# Patient Record
Sex: FEMALE | Race: WHITE | NOT HISPANIC OR LATINO | Employment: FULL TIME | URBAN - METROPOLITAN AREA
[De-identification: names, ages, dates, MRNs, and addresses within clinical notes are randomized per-mention and may not be internally consistent; named-entity substitution may affect disease eponyms.]

---

## 2018-05-01 ENCOUNTER — TRANSCRIBE ORDERS (OUTPATIENT)
Dept: PERINATAL CARE | Facility: CLINIC | Age: 33
End: 2018-05-01

## 2018-05-01 DIAGNOSIS — O99.810 ABNORMAL GLUCOSE TOLERANCE IN PREGNANCY: Primary | ICD-10-CM

## 2018-05-03 ENCOUNTER — OFFICE VISIT (OUTPATIENT)
Dept: PERINATAL CARE | Facility: CLINIC | Age: 33
End: 2018-05-03
Payer: COMMERCIAL

## 2018-05-03 ENCOUNTER — OFFICE VISIT (OUTPATIENT)
Dept: PERINATAL CARE | Facility: CLINIC | Age: 33
End: 2018-05-03

## 2018-05-03 VITALS
WEIGHT: 118.8 LBS | DIASTOLIC BLOOD PRESSURE: 64 MMHG | BODY MASS INDEX: 20.28 KG/M2 | HEART RATE: 86 BPM | SYSTOLIC BLOOD PRESSURE: 110 MMHG | HEIGHT: 64 IN

## 2018-05-03 VITALS
HEART RATE: 86 BPM | HEIGHT: 64 IN | SYSTOLIC BLOOD PRESSURE: 110 MMHG | DIASTOLIC BLOOD PRESSURE: 64 MMHG | BODY MASS INDEX: 20.14 KG/M2 | WEIGHT: 118 LBS

## 2018-05-03 DIAGNOSIS — O24.419 GESTATIONAL DIABETES MELLITUS (GDM) IN THIRD TRIMESTER, GESTATIONAL DIABETES METHOD OF CONTROL UNSPECIFIED: ICD-10-CM

## 2018-05-03 DIAGNOSIS — O99.810 ABNORMAL GLUCOSE AFFECTING PREGNANCY: Primary | ICD-10-CM

## 2018-05-03 DIAGNOSIS — O24.410 DIET CONTROLLED GESTATIONAL DIABETES MELLITUS (GDM) IN THIRD TRIMESTER: Primary | ICD-10-CM

## 2018-05-03 DIAGNOSIS — O99.810 ABNORMAL GLUCOSE TOLERANCE IN PREGNANCY: ICD-10-CM

## 2018-05-03 DIAGNOSIS — M25.50 ARTHRALGIA, UNSPECIFIED JOINT: ICD-10-CM

## 2018-05-03 DIAGNOSIS — Z3A.29 29 WEEKS GESTATION OF PREGNANCY: ICD-10-CM

## 2018-05-03 PROCEDURE — G0108 DIAB MANAGE TRN  PER INDIV: HCPCS | Performed by: DIETITIAN, REGISTERED

## 2018-05-03 PROCEDURE — 99215 OFFICE O/P EST HI 40 MIN: CPT | Performed by: NURSE PRACTITIONER

## 2018-05-03 RX ORDER — LANCETS 33 GAUGE
EACH MISCELLANEOUS
Qty: 200 EACH | Refills: 3 | Status: SHIPPED | OUTPATIENT
Start: 2018-05-03 | End: 2022-03-11

## 2018-05-03 RX ORDER — BLOOD SUGAR DIAGNOSTIC
STRIP MISCELLANEOUS
Qty: 150 EACH | Refills: 3 | Status: SHIPPED | OUTPATIENT
Start: 2018-05-03 | End: 2022-03-11

## 2018-05-03 NOTE — PATIENT INSTRUCTIONS
1  Start 1900 calorie GDM diet with 3 meals and 3 snacks including protein  2  Check fasting and 2 hours after start of meal    3  Keep glucose log and report tomorrow  4  Have labs done  5  Always have glucose available for hypoglycemia, use 15 by 15 rule  6  Goals fasting 60-90, 1 hour after start of meal 135 or less and 2 hours after start of meal 120 or less  7  Keep fetal growth US scheduled  8  Continue follow-up with your OB and MFM

## 2018-05-03 NOTE — PROGRESS NOTES
DATE: 18   RE: Cosmo Syed   : 1985  NELSON: Estimated Date of Delivery: 2018  EGA: 29  weeks    Dear Dr Francisco Hagen: Thank you for referring your patient to the [facility]regnancy Program at [address]  The patient was seen today for medical nutrition therapy for the treatment of diabetes during pregnancy  In addition to diabetes, the nutrition status is complicated by being underweight prior to pregnancy  The following was reviewed with the patient:      Weight gain during in pregnancy  Based on the patients height of 5' 4" (1 626 m) inches, pre-pregnancy weight of106 pounds (BMI -18 1), we would recommend a total weight gain of 28-40 pounds for the pregnancy  o The patients current weight is 53 9 kg (118 lb 12 8 oz) pounds, and her weight gain to date is 12 pounds   Basic review of macronutrients   Meal pattern should consist of three small meals and three snacks daily   Carbohydrate gram amounts per meal    Instructions on how to read a food label   Appropriate serving size of foods   Incorporating protein at each meal and snack in the importance of protein in relationship to blood glucose control   Individualized meal plan: 1900 gestational diabetes diet   Use of food diary to maintain a meal plan   Post-partum diet recommendations   Report blood glucose levels to [address] weekly or as directed  o Phone: [phone]  If no response in 24 hours, call [phone]   o Fax: [phone]  o Email: [email or web address]   Follow up: As Needed    Thank you for the opportunity to participate in the care of this patient  I can be reached at [phone] should you have any questions  Time spent with patient 3:30-4:40 PM; time spent face to face counseling greater than 50% of the appointment      Sincerely,     [facility]  Diabetes Educator  Diabetes and Pregnancy Program
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
## 2018-05-08 LAB
25(OH)D3 SERPL-MCNC: 42 NG/ML (ref 30–100)
ALBUMIN SERPL-MCNC: 3.3 G/DL (ref 3.6–5.1)
ALBUMIN/GLOB SERPL: 1.3 (CALC) (ref 1–2.5)
ALP SERPL-CCNC: 86 U/L (ref 33–115)
ALT SERPL-CCNC: 12 U/L (ref 6–29)
AST SERPL-CCNC: 15 U/L (ref 10–30)
BILIRUB SERPL-MCNC: 0.4 MG/DL (ref 0.2–1.2)
BUN SERPL-MCNC: 11 MG/DL (ref 7–25)
BUN/CREAT SERPL: ABNORMAL (CALC) (ref 6–22)
CALCIUM SERPL-MCNC: 8.7 MG/DL (ref 8.6–10.2)
CHLORIDE SERPL-SCNC: 105 MMOL/L (ref 98–110)
CO2 SERPL-SCNC: 27 MMOL/L (ref 20–31)
CREAT SERPL-MCNC: 0.52 MG/DL (ref 0.5–1.1)
EST. AVERAGE GLUCOSE BLD GHB EST-MCNC: 97 (CALC)
EST. AVERAGE GLUCOSE BLD GHB EST-SCNC: 5.4 (CALC)
GLOBULIN SER CALC-MCNC: 2.5 G/DL (CALC) (ref 1.9–3.7)
GLUCOSE SERPL-MCNC: 73 MG/DL (ref 65–99)
HBA1C MFR BLD: 5 % OF TOTAL HGB
POTASSIUM SERPL-SCNC: 4 MMOL/L (ref 3.5–5.3)
PROT SERPL-MCNC: 5.8 G/DL (ref 6.1–8.1)
SL AMB EGFR AFRICAN AMERICAN: 147 ML/MIN/1.73M2
SL AMB EGFR NON AFRICAN AMERICAN: 126 ML/MIN/1.73M2
SODIUM SERPL-SCNC: 137 MMOL/L (ref 135–146)
T4 FREE SERPL-MCNC: 0.9 NG/DL (ref 0.8–1.8)
TSH SERPL-ACNC: 1.47 MIU/L

## 2018-05-09 ENCOUNTER — TELEPHONE (OUTPATIENT)
Dept: PERINATAL CARE | Facility: CLINIC | Age: 33
End: 2018-05-09

## 2018-05-09 NOTE — TELEPHONE ENCOUNTER
Date:  18  RE: Adelaide Nielsen    : 1985  NELSON: Estimated Date of Delivery: 18  EGA: 30w0d    Diet controlled GDM  Date Fasting Post-  breakfast Post-  lunch Post-  dinner Comments    76 120 118 114 82 post snack    88 101 89 142 Ate out    80 101 97 137 Too much carb   79 93 95 107     90 91 117 107                                Current regimen:  1900 calorie GDM diet  Self-monitoring blood glucose fasting and 2 hours after start of meals  Exercise: walking  Plan:  Continue diet, avoid foods that may increase post meal BG, watch carb, protein and fat  Continue SMBG and walking regimen  18 A1c 5 0%, patient made aware all labs within normal      Date due to report next:  Tuesday, 18 or sooner if blood sugars are out of range        BARBARA Robles  Diabetes Educator   Diabetes and Pregnancy Program

## 2018-05-22 ENCOUNTER — DOCUMENTATION (OUTPATIENT)
Dept: PERINATAL CARE | Facility: CLINIC | Age: 33
End: 2018-05-22

## 2018-05-22 NOTE — PROGRESS NOTES
Date:  18  RE: Giselle Scott    : 1985  NELSON: Estimated Date of Delivery: 18  EGA: 30w6d    Diet controlled GDM  Date Fasting Post-  breakfast Post-  lunch Post-  dinner Comments    69 96 119 111    5/10 78 95 86 95     92 87 100 121     74 90 120 98     64 87 82 95     75 109 97 105    5/15 73 97 97 120                Current regimen:  1900 calorie GDM diet  Self-monitoring blood glucose fasting and 2 hours after start of meals  Exercise: walking  Plan:  Continue diet, avoid foods that may increase post meal BG, watch carb, protein and fat  Continue SMBG and walking regimen  18 A1c 5 0%  Date due to report next:  Tuesday, 18 or sooner if needed        BARBARA Griffin  Diabetes Educator   Diabetes and Pregnancy Program

## 2018-05-25 ENCOUNTER — TELEPHONE (OUTPATIENT)
Dept: PERINATAL CARE | Facility: CLINIC | Age: 33
End: 2018-05-25

## 2018-05-25 NOTE — TELEPHONE ENCOUNTER
Date:  18  RE: Uday Smith    : 1985  NELSON: Estimated Date of Delivery: 18  EGA: 32w2d    Diet controlled GDM  Date Fasting Post-  breakfast Post-  lunch Post-  dinner Comments    77 90 118 103     81 91 111 111     81 94 114 123     72 92 99 80     70 83 141 114     67 103 109 86     69 89 77 110     78 86 107 89     74 98 110 129        Current regimen:  1900 calorie GDM diet  Self-monitoring blood glucose fasting and 2 hours after start of meals  Exercise: walking  Plan:  Continue diet, avoid foods that may increase post meal BG, watch carb, protein and fat  Continue SMBG and walking regimen  18 A1c 5 0%  Date due to report next:  Tuesday, 18 or sooner if needed        Chu Khan  Diabetes Educator   Diabetes and Pregnancy Program

## 2018-05-30 ENCOUNTER — DOCUMENTATION (OUTPATIENT)
Dept: PERINATAL CARE | Facility: CLINIC | Age: 33
End: 2018-05-30

## 2018-05-30 NOTE — PROGRESS NOTES
Date:  18  RE: Uday Luis    : 1985  NELSON: Estimated Date of Delivery: 18  EGA: 33w0d  Dr Ceron Section controlled GDM  Date Fasting Post-  breakfast Post-  lunch Post-  dinner Comments   -16 77 90 118 103    -17 81 91 111 111    -18 81 94 114 123    - 72 92 99 80    -20 70 83 141 114    - 67 103 109 86    - 69 89 77 110    - 78 86 107 89    -24 74 98 110 129        Current regimen:  1900 calorie GDM diet  Self-monitoring blood glucose fasting and 2 hours after start of meals  Exercise: walking  Plan:  Continue diet with 3 meals and 3 snacks including protein  Continue SMBG and walking regimen  18 A1c 5 0%  Will request last fetal growth US report  Date due to report next:  Tuesday, 18 or sooner if needed      BARBARA Larson  Diabetes Educator   Diabetes and Pregnancy Program

## 2018-06-06 ENCOUNTER — TELEPHONE (OUTPATIENT)
Dept: PERINATAL CARE | Facility: CLINIC | Age: 33
End: 2018-06-06

## 2018-06-06 NOTE — TELEPHONE ENCOUNTER
Date:  18  RE: Minor Harm    : 1985  NELSON: Estimated Date of Delivery: 18  EGA: 33w0d  Dr Zarina Miranda controlled GDM  Blood Glucose Meter: One-Touch Verio    Date Fasting Post-  breakfast Post-  lunch Post-  dinner Comments    69 98 95 116     73 108 119 104     68 89 82 107     79 87 86 116     80 92 104 98     87 102 100 104     73 112 93 118     86 116 102 119     79 107 118 89    6/3 83 110 98 128 chips    78 114 103 106     79 102 110 139 Pizza w/ extra meat & cheese    73 105          Current regimen:  1900 calorie GDM diet  Self-monitoring blood glucose fasting and 2 hours after start of meals  Exercise: walking  Plan:  Continue diet with 3 meals and 3 snacks including protein  Continue SMBG and walking regimen  18 A1c 5 0%  Will request last fetal growth US report  Date due to report next:   or sooner if needed      Che Claros  Diabetes Educator   Diabetes and Pregnancy Program

## 2018-06-13 ENCOUNTER — TELEPHONE (OUTPATIENT)
Dept: PERINATAL CARE | Facility: CLINIC | Age: 33
End: 2018-06-13

## 2018-06-20 ENCOUNTER — TELEPHONE (OUTPATIENT)
Dept: PERINATAL CARE | Facility: CLINIC | Age: 33
End: 2018-06-20

## 2018-06-20 NOTE — TELEPHONE ENCOUNTER
Note      Date:  18  RE: Annette Campos    : 1985  NELSON: Estimated Date of Delivery: 18  EGA: 36w0d  Dr Nogueira Morning     Diet controlled GDM  Blood Glucose Meter: One-Touch Verio     Date Fasting Post-  breakfast Post-  lunch Post-  dinner Comments    67 104 90 145      74 101 105 109     6/15 74 93 103 112      74 103 95 117      65 110 118 106      78 118 107 105  snacks    77 98 105 11                      Current regimen:  1900 calorie GDM diet  Food diary log indicated patient is eating only 75 gm CHO at all  3 meals total  Food diary did not include snacks  Wt gain in 35 wks is 12 pounds total    Self-monitoring blood glucose fasting and 2 hours after start of meals  Exercise: walking       Plan:  Continue diet with 3 meals and 3 snacks including protein  Advised patient to include her 3 snacks in her food diary to determine nutrition adequacy for appropriate fetal growth  Continue SMBG and walking regimen  18 A1c 5 0%  Last fetal growth US appeared normal  Continue fetal growth every 4 weeks       Date due to report next:  Tuesday, 18 or sooner if needed      Transcribed by:  Olga Morley blood sugars login via email    Ruperto Smith, [address]  Diabetes Educator   Diabetes and Pregnancy Program

## 2018-06-29 ENCOUNTER — TELEPHONE (OUTPATIENT)
Dept: PERINATAL CARE | Facility: CLINIC | Age: 33
End: 2018-06-29

## 2018-06-29 NOTE — TELEPHONE ENCOUNTER
Note      Date:  18  RE: Sri Ma    : 1985  NELSON: Estimated Date of Delivery: 18  EGA: 37w2d  Dr Evert Lopez     Diet controlled GDM  Blood Glucose Meter: One-Touch Verio     Date Fasting Post-  breakfast Post-  lunch Post-  dinner Comments   6-20 78 107 88 113    6-21 71 104 119 104    - 79 94 113 103    - 82 102 104 106    -24 67 110 99 100     79 92 111 116    - 77 116 97 119    - 63 80 102 115    - 72 109 78 96     83                Current regimen:  1900 calorie GDM diet; 3 meals and 3 snacks  Self-monitoring blood glucose fasting and 2 hours after start of meals  Exercise: walking       Plan:  Continue diet with 3 meals and 3 snacks including protein  Advised patient to include her 3 snacks in her food diary to determine nutrition adequacy for appropriate fetal growth  Continue SMBG and walking regimen  18 A1c 5 0%  Last fetal growth US appeared normal  Continue fetal growth every 4 weeks       Date due to report next:  Tuesday, 7/3/18 or sooner if needed        Calderon Suazo  Diabetes Educator   Diabetes and Pregnancy Program

## 2018-07-06 ENCOUNTER — TELEPHONE (OUTPATIENT)
Dept: PERINATAL CARE | Facility: CLINIC | Age: 33
End: 2018-07-06

## 2018-07-06 NOTE — TELEPHONE ENCOUNTER
Date:  18  RE: Amanda Orozco    : 1985  Estimated Date of Delivery: 18  EGA: 38w2d  OB/GYN: Dufm Monday      Date Fasting Post-  breakfast Post-  lunch Post-  dinner Before bedtime Carbs Comments   6-29  100 95 96      6-30 80 111 101 94      7-1 75 93 78 117      7-2 73 97 107 113      7-3 83 102 89 79      7-4 72 104 63 125 116     7-5 76 94 102 104          Current regimen:  1900 calorie GDM diet; 3 meals and 3 snacks  Self-monitoring blood glucose fasting and 2 hours after start of meals  Exercise: walking  HbA1c on 18- 5%    Plan:  Continue current regimen      Date due to report next:  Thursday, 18    Ijeoma Guerra RN  Diabetes Educator   Diabetes and Pregnancy Program

## 2018-09-16 DIAGNOSIS — O24.419 GESTATIONAL DIABETES MELLITUS (GDM) IN THIRD TRIMESTER, GESTATIONAL DIABETES METHOD OF CONTROL UNSPECIFIED: ICD-10-CM

## 2018-09-16 DIAGNOSIS — O99.810 ABNORMAL GLUCOSE AFFECTING PREGNANCY: ICD-10-CM

## 2018-09-16 DIAGNOSIS — Z3A.29 29 WEEKS GESTATION OF PREGNANCY: ICD-10-CM

## 2018-09-16 DIAGNOSIS — M25.50 ARTHRALGIA, UNSPECIFIED JOINT: ICD-10-CM

## 2018-09-17 RX ORDER — BLOOD SUGAR DIAGNOSTIC
STRIP MISCELLANEOUS
Refills: 3 | OUTPATIENT
Start: 2018-09-17

## 2022-02-14 ENCOUNTER — TELEPHONE (OUTPATIENT)
Dept: PERINATAL CARE | Facility: CLINIC | Age: 37
End: 2022-02-14

## 2022-02-24 ENCOUNTER — TELEPHONE (OUTPATIENT)
Dept: PERINATAL CARE | Facility: CLINIC | Age: 37
End: 2022-02-24

## 2022-02-24 NOTE — TELEPHONE ENCOUNTER
Avelino Gonzalez called the nurse line inquiring about her NT appointment and genetic testing that is done at that appointment  Instructed pt that NIPT testing is optional and assess for Trisomy 13,18 &21 and sex chromosome  Abnormalities  Pt verbalized understanding and requested to speak to genetic counselor for further assistance, inquiring about other genetic options  Call forward to genetic  counselor office

## 2022-02-25 ENCOUNTER — TELEPHONE (OUTPATIENT)
Dept: PERINATAL CARE | Facility: CLINIC | Age: 37
End: 2022-02-25

## 2022-02-25 NOTE — TELEPHONE ENCOUNTER
Patient left message on genetic counseling voicemail asking to review Yovana's blood work which was offered to her by her OB office  She has upcoming NT ultrasound in March and wanted to know if the screening was the same thing  Called patient back and confirmed date of birth  Reveiwed that [facility] Gonsalez offers their own version of NIPT and that [facility] typically uses [facility] or [facility]   Discussed the differences between the tests as well as the benefits and limitations of NIPT  Patient stated that she would most likely use yovana as she knows it would be covered and the out of pocket cost is lower  Discussed that she would not need any bloodwork then at the time of her NT ultrasound  She also inquired about the horizon screen which we discussed is a carrier screening panel  The patient stated that she had carrier screening done previously and was identified to be a carrier for Factor 5 and Gaucher disease  Her  has never had screening for Gaucher carrier status  We reviewed that screening is available for him however I would prefer to have her carrier screening results prior to coordination for his blood draw to confirm what is needed  Charley López stated that she can get us those results  Once they are obtained a prior authorization can be started for her  for the proper lab  Patient expressed verbal understanding and had no questions  She was encouraged to call with any concerns

## 2022-03-11 ENCOUNTER — ROUTINE PRENATAL (OUTPATIENT)
Dept: PERINATAL CARE | Facility: OTHER | Age: 37
End: 2022-03-11
Payer: COMMERCIAL

## 2022-03-11 VITALS
BODY MASS INDEX: 18.57 KG/M2 | HEIGHT: 63 IN | HEART RATE: 88 BPM | SYSTOLIC BLOOD PRESSURE: 102 MMHG | DIASTOLIC BLOOD PRESSURE: 62 MMHG | WEIGHT: 104.8 LBS

## 2022-03-11 DIAGNOSIS — Z3A.12 12 WEEKS GESTATION OF PREGNANCY: ICD-10-CM

## 2022-03-11 DIAGNOSIS — Z36.82 NUCHAL TRANSLUCENCY OF FETUS ON PRENATAL ULTRASOUND: ICD-10-CM

## 2022-03-11 DIAGNOSIS — O99.810 ABNORMAL GLUCOSE AFFECTING PREGNANCY: ICD-10-CM

## 2022-03-11 DIAGNOSIS — O09.521 SUPERVISION OF ELDERLY MULTIGRAVIDA, FIRST TRIMESTER: Primary | ICD-10-CM

## 2022-03-11 DIAGNOSIS — O09.899 SUPERVISION OF OTHER HIGH RISK PREGNANCIES, UNSPECIFIED TRIMESTER: ICD-10-CM

## 2022-03-11 PROCEDURE — 99241 PR OFFICE CONSULTATION NEW/ESTAB PATIENT 15 MIN: CPT | Performed by: OBSTETRICS & GYNECOLOGY

## 2022-03-11 PROCEDURE — 76813 OB US NUCHAL MEAS 1 GEST: CPT | Performed by: OBSTETRICS & GYNECOLOGY

## 2022-03-11 PROCEDURE — 76801 OB US < 14 WKS SINGLE FETUS: CPT | Performed by: OBSTETRICS & GYNECOLOGY

## 2022-03-11 RX ORDER — PROGESTERONE 200 MG/1
100 CAPSULE ORAL DAILY
COMMUNITY
End: 2022-03-15 | Stop reason: ALTCHOICE

## 2022-03-11 NOTE — LETTER
2022     Lakeshia Black MD  [address] [facility] [address]  [address] [place] [address]    Patient: Mary Hill   YOB: 1985   Date of Visit: 3/11/2022       Dear Dr Leo Arvizu: Thank you for referring Mary Hill to me for evaluation  Below are my notes for this consultation  If you have questions, please do not hesitate to call me  I look forward to following your patient along with you  Sincerely,        Marylen Bryant, MD        CC: No Recipients  Marylen Bryant, MD  3/12/2022 10:40 PM  Sign when Signing Visit  OFFICE CONSULT  Referring physician:   Sherman Ghosh      Dear Dr Leo Arvizu      Thank you for requesting a  consultation on your patient Ms  Mary Hill for the following indications:  Genetic screening    History  Medications:  Prenatal vitamins and progesterone 100 milligrams q h s  Allergies to medications:  Sulfa drugs  Past medical history:  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 and history of gestational diabetes on diet in her previous pregnancy and 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  Patient reports that in her previous pregnancy when tested for gestational diabetes her Glucola returned as 0 both during pregnancy and postpartum  I saw a glucola in her record from 18 that was 238  Surprisingly she states though her hemoglobin A1cs are normal and she could control her sugars with diet last pregnancy  Past surgical history:  None  Past obstetrical history:  2 prior miscarriages in  and  and a 38 week vaginal delivery of a baby girl that weighed 6 pounds 3 ounces on 7/10/18  Her baby required a NICU stay due to low blood sugars  Social history:  Denies  First generation family history:  Her father has Factor 5 Leiden  She thinks maybe he was screened because of a history of a myocardial infarction    She completed the panorama screen yesterday    She completed the COVID vaccine and the flu vaccine  Ultrasound findings: The ultrasound shows a fetus concordant with dates  The nasal bone and nuchal translucency appears normal  No malformations are seen on today's early ultrasound  The patient was informed of the findings and counseled about the limitations of the exam in detecting all forms of fetal congenital abnormalities  She does not report any vaginal bleeding or uterine cramping or contractions  Specific counseling was provided on the following problems:  1  Recommend genetic counseling to help this couple undergo screening for her partner for Gaucher's disease  2  Recommend referral to diabetes Education for a glucometer to test for diabetes as her Glucola screen gives her an abnormally high result and she questions the validity  Future tests recommended:  1  Her OB office will offer to order an MSAFP screen at 16-18 weeks to screen for spina bifida  Future ultrasounds ordered today:   1  Fetal Level II ultrasound imaging is recommended at 19-20 weeks' gestation  2  Will give future recommendations with her next ultrasound once we determine if Sherman Shepard qualifies as a gestational diabetic in this pregnancy         The face to face time, in addition to time spent discussing ultrasound results, was approximately 15 minutes, greater than 50% of which was spent during counseling and coordination of care    Sree Westbrook MD

## 2022-03-11 NOTE — PROGRESS NOTES
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  Patient reports that in her previous pregnancy when tested for gestational diabetes her Glucola returned as 0 both during pregnancy and postpartum  I saw a glucola in her record from 18 that was 238  Surprisingly she states though her hemoglobin A1cs are normal and she could control her sugars with diet last pregnancy  Past surgical history:  None  Past obstetrical history:  2 prior miscarriages in  and  and a 38 week vaginal delivery of a baby girl that weighed 6 pounds 3 ounces on 7/10/18  Her baby required a NICU stay due to low blood sugars  Social history:  Denies  First generation family history:  Her father has Factor 5 Leiden  She thinks maybe he was screened because of a history of a myocardial infarction    She completed the panorama screen yesterday  She completed the COVID vaccine and the flu vaccine  Ultrasound findings: The ultrasound shows a fetus concordant with dates  The nasal bone and nuchal translucency appears normal  No malformations are seen on today's early ultrasound  The patient was informed of the findings and counseled about the limitations of the exam in detecting all forms of fetal congenital abnormalities  She does not report any vaginal bleeding or uterine cramping or contractions  Specific counseling was provided on the following problems:  1   Recommend genetic counseling to help this couple undergo screening for her partner for Gaucher's disease  2  Recommend referral to diabetes Education for a glucometer to test for diabetes as her Glucola screen gives her an abnormally high result and she questions the validity  Future tests recommended:  1  Her OB office will offer to order an MSAFP screen at 16-18 weeks to screen for spina bifida  Future ultrasounds ordered today:   1  Fetal Level II ultrasound imaging is recommended at 19-20 weeks' gestation  2  Will give future recommendations with her next ultrasound once we determine if Juan Michelle qualifies as a gestational diabetic in this pregnancy  The face to face time, in addition to time spent discussing ultrasound results, was approximately 15 minutes, greater than 50% of which was spent during counseling and coordination of care    Sree Westbrook MD     Addendum: 3/27/2022  Patient reports she was tested for factor V leiden because her father is a carrier  Her father developed a clot where the lead (wire) from his defibrillator enters the subclavian vein  After the fact he was told it is fairly common for clots to form there

## 2022-03-15 ENCOUNTER — TELEMEDICINE (OUTPATIENT)
Dept: PERINATAL CARE | Facility: CLINIC | Age: 37
End: 2022-03-15
Payer: COMMERCIAL

## 2022-03-15 VITALS — WEIGHT: 104 LBS | BODY MASS INDEX: 18.43 KG/M2 | HEIGHT: 63 IN

## 2022-03-15 DIAGNOSIS — O09.291 HISTORY OF GESTATIONAL DIABETES IN PRIOR PREGNANCY, CURRENTLY PREGNANT IN FIRST TRIMESTER: ICD-10-CM

## 2022-03-15 DIAGNOSIS — Z3A.12 12 WEEKS GESTATION OF PREGNANCY: ICD-10-CM

## 2022-03-15 DIAGNOSIS — Z86.32 HISTORY OF GESTATIONAL DIABETES IN PRIOR PREGNANCY, CURRENTLY PREGNANT IN FIRST TRIMESTER: ICD-10-CM

## 2022-03-15 DIAGNOSIS — R73.09 ABNORMAL GLUCOSE TOLERANCE TEST: Primary | ICD-10-CM

## 2022-03-15 PROCEDURE — 99213 OFFICE O/P EST LOW 20 MIN: CPT | Performed by: NURSE PRACTITIONER

## 2022-03-15 RX ORDER — BLOOD SUGAR DIAGNOSTIC
STRIP MISCELLANEOUS
Qty: 100 EACH | Refills: 0 | Status: SHIPPED | OUTPATIENT
Start: 2022-03-15 | End: 2022-03-29 | Stop reason: SDUPTHER

## 2022-03-15 RX ORDER — BLOOD-GLUCOSE METER
EACH MISCELLANEOUS
Qty: 1 KIT | Refills: 0 | Status: SHIPPED | OUTPATIENT
Start: 2022-03-15

## 2022-03-15 RX ORDER — LANCETS 33 GAUGE
EACH MISCELLANEOUS
Qty: 50 EACH | Refills: 0 | Status: SHIPPED | OUTPATIENT
Start: 2022-03-15 | End: 2022-03-29 | Stop reason: SDUPTHER

## 2022-03-15 NOTE — ASSESSMENT & PLAN NOTE
-Continue with regular diet   -Start SMBG fasting and 2 hours post start of each meal   -If abnormal levels; OBGYN to diagnosis GDM and refer to dietitian   -If normal levels; repeat 1 week of testing around 24 to 28 weeks gestation    -Continue follow up with OB and MFM as recommended

## 2022-03-15 NOTE — PROGRESS NOTES
Virtual Regular Visit    Verification of patient location:[place]    Patient is located in the following state in which I hold an active license [place]      Assessment/Plan:    Problem List Items Addressed This Visit        Other    History of gestational diabetes in prior pregnancy, currently pregnant in first trimester    Relevant Medications    Blood Glucose Monitoring Suppl (OneTouch Verio Flex System) w/Device KIT    OneTouch Delica Lancets 81M MISC    OneTouch Verio test strip    Other Relevant Orders    [facility] glucose flowsheet    Abnormal glucose tolerance test - Primary     -Continue with regular diet   -Start SMBG fasting and 2 hours post start of each meal   -If abnormal levels; OBGYN to diagnosis GDM and refer to dietitian   -If normal levels; repeat 1 week of testing around 24 to 28 weeks gestation    -Continue follow up with OB and MFM as recommended  12 weeks gestation of pregnancy               Reason for visit is   Chief Complaint   Patient presents with    Virtual Regular Visit    Patient Education        Encounter provider Elvira Ontiveros [address]     Provider located at [address] [place] [id number] [phone]      Recent Visits  No visits were found meeting these conditions  Showing recent visits within past 7 days and meeting all other requirements  Today's Visits  Date Type Provider Dept   03/15/22 215[address] [facility], [address] [place] [address] today's visits and meeting all other requirements  Future Appointments  No visits were found meeting these conditions  Showing future appointments within next 150 days and meeting all other requirements       The patient was identified by name and date of birth  Rafael Vance was informed that this is a telemedicine visit and that the visit is being conducted through [facility] and patient was informed this is a secure, HIPAA-complaint platform   She agrees to proceed     My office door was closed  No one else was in the room  She acknowledged consent and understanding of privacy and security of the video platform  The patient has agreed to participate and understands they can discontinue the visit at any time  Patient is aware this is a billable service  Subjective  Anahy Vázquez is a 39 y o  female  12 2/7 weeks gestation  History GDM in 2018 with 1 hour  and 6 months post delivery abnormal glucose tolerance test  Evaluated by 2 endocrinologist without diagnosis  Prefers to test glucose for 1 week vs completing 1 hour glucose tolerance  Last pregnancy was diet controlled  Delivered in 2018 baby girl vaginally who weighed 6 lbs 3 oz at 39 weeks gestation   present during visit virtually then lost connection and visit completed via phone  HPI     Past Medical History:   Diagnosis Date    Anemia     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    GDM (gestational diabetes mellitus)        Past Surgical History:   Procedure Laterality Date    WISDOM TOOTH EXTRACTION         Current Outpatient Medications   Medication Sig Dispense Refill    Blood Glucose Monitoring Suppl (OneTouch Verio Flex System) w/Device KIT Dispense 1 kit per insurance formulary  1 kit 0    OneTouch Delica Lancets 16U MISC Use 4 a day or as directed  50 each 0    OneTouch Verio test strip Test 4 times a day and as instructed  Gestational diabetes  100 each 0    Prenatal MV-Min-Fe Fum-FA-DHA (PRENATAL 1 PO) Take by mouth       No current facility-administered medications for this visit  Allergies   Allergen Reactions    Sulfa Antibiotics Rash       Review of Systems   Constitutional: Negative for fever  Eyes: Negative for visual disturbance  Respiratory: Negative for cough and shortness of breath  Gastrointestinal: Positive for nausea  Endocrine: Positive for polyuria  Negative for polydipsia and polyphagia  Neurological: Negative for headaches  Psychiatric/Behavioral: Positive for sleep disturbance  Video Exam    Vitals:    03/15/22 1350   Weight: 47 2 kg (104 lb)   Height: 5' 3" (1 6 m)       Physical Exam  HENT:      Head: Normocephalic  Eyes:      Conjunctiva/sclera: Conjunctivae normal    Pulmonary:      Effort: Pulmonary effort is normal    Musculoskeletal:      Cervical back: Normal range of motion  Neurological:      Mental Status: She is alert and oriented to person, place, and time  Psychiatric:         Mood and Affect: Mood normal          Behavior: Behavior normal          Thought Content: Thought content normal          Judgment: Judgment normal           I spent 20 minutes directly with the patient during this visit  VIRTUAL VISIT DISCLAIMER      Mary Liz verbally agrees to participate in [employer]  Pt is aware that [employer] could be limited without vital signs or the ability to perform a full hands-on physical [employer] understands she or the provider may request at any time to terminate the video visit and request the patient to seek care or treatment in person

## 2022-03-15 NOTE — ASSESSMENT & PLAN NOTE
-Pre-pregnancy weight 100 to 103 lbs  -Current weight 104 lbs  -Recommended weight gain during pregnancy 25 to 35 lbs

## 2022-03-17 ENCOUNTER — TELEMEDICINE (OUTPATIENT)
Dept: PERINATAL CARE | Facility: CLINIC | Age: 37
End: 2022-03-17

## 2022-03-17 DIAGNOSIS — Z14.8 CARRIER OF GAUCHER DISEASE: ICD-10-CM

## 2022-03-17 DIAGNOSIS — Z31.5 ENCOUNTER FOR PROCREATIVE GENETIC COUNSELING: ICD-10-CM

## 2022-03-17 DIAGNOSIS — O35.2XX0 HEREDITARY DISEASE IN FAMILY POSSIBLY AFFECTING FETUS, AFFECTING MANAGEMENT OF MOTHER IN PREGNANCY, SINGLE OR UNSPECIFIED FETUS: Primary | ICD-10-CM

## 2022-03-17 PROCEDURE — NC001 PR NO CHARGE

## 2022-03-18 ENCOUNTER — PATIENT MESSAGE (OUTPATIENT)
Dept: PERINATAL CARE | Facility: OTHER | Age: 37
End: 2022-03-18

## 2022-03-27 PROBLEM — D68.51 FACTOR V LEIDEN CARRIER (HCC): Status: ACTIVE | Noted: 2022-03-27

## 2022-03-28 ENCOUNTER — DOCUMENTATION (OUTPATIENT)
Dept: PERINATAL CARE | Facility: CLINIC | Age: 37
End: 2022-03-28

## 2022-03-28 NOTE — PROGRESS NOTES
Date: 03/28/22  Andrew Hathaway  1985  Estimated Date of Delivery: 9/25/22  14w1d  OB/GYN: [facility]  Abnormal Glucose Tolerance    History GDM in 2018 with 1 hour  and 6 months post delivery abnormal glucose tolerance test  Evaluated by 2 endocrinologist without diagnosis  Prefers to test glucose for 1 week vs completing 1 hour glucose tolerance  Last pregnancy was diet controlled  Additional Pregnancy Complications: History of diet controlled GDM          Plan:  Provider to make final decision on gestational diabetes  ([place] does not have Epic)   FBS      Post meals:    Lunch had 4 elevations     Dinner had 2 elevations  Diet: : 3 meals and 3 snacks  Testing blood sugars: 4 x per day (Fasting, 2 hour after start of each meal)  Meter: OneTouch Verio Flex  Education:  Meter education completed with Mariah Chong on 03/15/2022      Labs  No components found for: HGA1C    Ultrasounds  03/11/2022 NT  Next US scheduled for 05/04/2022    Amrit Warner RN

## 2022-03-29 DIAGNOSIS — Z86.32 HISTORY OF GESTATIONAL DIABETES IN PRIOR PREGNANCY, CURRENTLY PREGNANT IN FIRST TRIMESTER: ICD-10-CM

## 2022-03-29 DIAGNOSIS — O09.291 HISTORY OF GESTATIONAL DIABETES IN PRIOR PREGNANCY, CURRENTLY PREGNANT IN FIRST TRIMESTER: ICD-10-CM

## 2022-03-29 RX ORDER — LANCETS 33 GAUGE
EACH MISCELLANEOUS
Qty: 100 EACH | Refills: 6 | Status: SHIPPED | OUTPATIENT
Start: 2022-03-29

## 2022-03-29 RX ORDER — BLOOD SUGAR DIAGNOSTIC
STRIP MISCELLANEOUS
Qty: 100 EACH | Refills: 6 | Status: SHIPPED | OUTPATIENT
Start: 2022-03-29

## 2022-04-01 NOTE — PROGRESS NOTES
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  Pregnancy History:   Estimated Date of Delivery: 22  Estimated Gestational Age: 13w2d      Virtual Regular Visit    Verification of patient location:    Patient is located in the following state in which I hold an active license [place]      Assessment/Plan:    Problem List Items Addressed This Visit     None      Visit Diagnoses     Hereditary disease in family possibly affecting fetus, affecting management of mother in pregnancy, single or unspecified fetus    -  Primary    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        Encounter for procreative genetic counseling                   Reason for visit is   Chief Complaint   Patient presents with    Virtual Regular Visit        Encounter provider Collin Sanabria    Provider located at [address] [place] [id number] [phone]      Recent Visits  No visits were found meeting these conditions  Showing recent visits within past 7 days and meeting all other requirements  Future Appointments  No visits were found meeting these conditions  Showing future appointments within next 150 days and meeting all other requirements       The patient was identified by name and date of birth  Marykay Babinski was informed that this is a telemedicine visit and that the visit is being conducted through [address] and patient was informed this is a secure, HIPAA-complaint platform  She agrees to proceed     My office door was closed  No one else was in the room  She acknowledged consent and understanding of privacy and security of the video platform   The patient has agreed to participate and understands they can discontinue the visit at any cat Sparrow is a 39 y o  female who presented with her partner for genetic counseling to discuss her abnormal carrier screening results for Gaucher disease  The patient previously had expanded carrier screening that identified a mutation in the GBA gene consistent with being an 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  These results were not available to review at the time of our session thus the patient stated she will try to get a copy and forward them to our office  The morbidity and mortality of Gaucher disease was reviewed  We also discussed the autosomal recessive inheritance pattern, thus if her partner is also a carrier there is a 25% chance for the pregnancy to be affected  Prenatal diagnosis via amniocentesis would then be available  Carrier screening was offered for Carlos Swenson for just the condition in question (GeneSeq) or an entire expanded panel  We discussed that prior authorization will be needed if planing on pursuing testing through his insurance  We reviewed that the out of pocket cost for the specific disease GeneSeq is $299  The couple opted to start a prior authorization for the GeneSeq while they further consider the option, thus if it is covered they can pursue the testing  We will contact them once approval is obtained  Mariya Monteiro had an NIPT screen earlier in the pregnancy which was negative, and had a normal nuchal translucency ultrasound on 3/11/22  She is planning on pursuing MSAFP screening and Level II anatomy ultrasound at the appropriate times  Lastly, we discussed the fact that everyone in the general population regardless of age, family history, or medical background has approximately a 3-5% risk of having a child with some type of congenital anomaly, genetic disease or intellectual disability  Currently there are no tests available to rule out all birth defects or health problems  Mariya Monteiro was provided with our contact information    I encouraged her to call with any questions or concerns  Plan/Tests Ordered:  1) Obtain and review Sandy's genetic testing results  2) FOB elected carrier screening for Gaucher disease pending insurance approval   3) MSAFP screening at 15-20 weeks gestation - to be ordered by patient OB office  4) Level II anatomy ultrasound at approximately 20 weeks gestation  HPI     Past Medical History:   Diagnosis Date    Anemia     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     carrier    GDM (gestational diabetes mellitus)        Past Surgical History:   Procedure Laterality Date    WISDOM TOOTH EXTRACTION         Current Outpatient Medications   Medication Sig Dispense Refill    Blood Glucose Monitoring Suppl (OneTouch Verio Flex System) w/Device KIT Dispense 1 kit per insurance formulary  1 kit 0    OneTouch Delica Lancets 65H MISC Use 4 a day or as directed   each 6    OneTouch Verio test strip Test 4 times a day and as instructed  Gestational diabetes  100 each 6    Prenatal MV-Min-Fe Fum-FA-DHA (PRENATAL 1 PO) Take by mouth       No current facility-administered medications for this visit  Allergies   Allergen Reactions    Sulfa Antibiotics Rash       Review of Systems    Video Exam    There were no vitals filed for this visit  Physical Exam     I spent 30 minutes directly with the patient during this visit    VIRTUAL VISIT [address] verbally agrees to participate in [employer]  Pt is aware that [employer] could be limited without vital signs or the ability to perform a full hands-on physical [place] understands she or the provider may request at any time to terminate the video visit and request the patient to seek care or treatment in person

## 2022-04-06 ENCOUNTER — TELEPHONE (OUTPATIENT)
Dept: PERINATAL CARE | Facility: CLINIC | Age: 37
End: 2022-04-06

## 2022-04-06 NOTE — TELEPHONE ENCOUNTER
Patient's  is covered at 90% after deductible has been met for genetic screening, no pre-authorization needed per Yair Schwarz  Ref# [id number]  Called patient to inform, left message to call back

## 2022-04-12 ENCOUNTER — DOCUMENTATION (OUTPATIENT)
Dept: PERINATAL CARE | Facility: CLINIC | Age: 37
End: 2022-04-12

## 2022-04-12 NOTE — PROGRESS NOTES
Date: 04/12/22  Lor Kaiden  1985  Estimated Date of Delivery: 9/25/22  16w2d  OB/GYN: [facility]  Abnormal Glucose Tolerance    History GDM in 2018 with 1 hour  and 6 months post delivery abnormal glucose tolerance test  Evaluated by 2 endocrinologist without diagnosis  Prefers to test glucose for 1 week vs completing 1 hour glucose tolerance  Last pregnancy was diet controlled  Additional Pregnancy Complications: History of diet controlled GDM          Plan:  Provider to make final decision on gestational diabetes  ([place] does not have Epic)   FBS      Post meals:    Lunch had 4 elevations     Dinner had 2 elevations  Diet: : 3 meals and 3 snacks  Maintain fasting overnight for at least 8 hrs and no more than 10 hrs prior to FBG measurement  (Had education in 2018)  Testing blood sugars: 3/30/22 Directions for testing from CRNP:    Meter: OneTouch Verio Flex It is okay to only test twice a day fasting and 1 or 2 hours post start of each meal alternating between post breakfast, lunch and dinner  In the future if needed will increase to 4 times a day  Education:  Meter education completed with Jey Vasquez on 03/15/2022    Patient is scheduled for combination class 1 and 2 on 4/21/22   Labs  No components found for: HGA1C   Last A1c 5 0% on 5/7/18    Ultrasounds  03/11/2022 NT  Next US scheduled for 05/04/2022    Caitlyn Pruitt RD,LDN,CDE

## 2022-04-21 ENCOUNTER — TELEMEDICINE (OUTPATIENT)
Dept: PERINATAL CARE | Facility: CLINIC | Age: 37
End: 2022-04-21
Payer: COMMERCIAL

## 2022-04-21 DIAGNOSIS — O09.291 HISTORY OF GESTATIONAL DIABETES IN PRIOR PREGNANCY, CURRENTLY PREGNANT IN FIRST TRIMESTER: ICD-10-CM

## 2022-04-21 DIAGNOSIS — R73.09 ABNORMAL GLUCOSE TOLERANCE TEST: ICD-10-CM

## 2022-04-21 DIAGNOSIS — Z86.32 HISTORY OF GESTATIONAL DIABETES IN PRIOR PREGNANCY, CURRENTLY PREGNANT IN FIRST TRIMESTER: ICD-10-CM

## 2022-04-21 DIAGNOSIS — Z3A.17 17 WEEKS GESTATION OF PREGNANCY: ICD-10-CM

## 2022-04-21 DIAGNOSIS — O24.419 GESTATIONAL DIABETES MELLITUS (GDM) IN SECOND TRIMESTER, GESTATIONAL DIABETES METHOD OF CONTROL UNSPECIFIED: Primary | ICD-10-CM

## 2022-04-21 PROCEDURE — G0108 DIAB MANAGE TRN  PER INDIV: HCPCS

## 2022-04-21 NOTE — PROGRESS NOTES
Virtual Regular Visit    Verification of patient location:    Patient is located in the following state in which I hold an active license [place]      Assessment/Plan:    Problem List Items Addressed This Visit        Other    History of gestational diabetes in prior pregnancy, currently pregnant in first trimester    Abnormal glucose tolerance test    12 weeks gestation of pregnancy      Other Visit Diagnoses     Gestational diabetes mellitus (GDM) in second trimester, gestational diabetes method of control unspecified    -  Primary               Reason for visit is   Chief Complaint   Patient presents with    Patient Education    Virtual Regular Visit        Encounter provider Efrain Garcia    Provider located at [address] [place] [id number] [phone]      Recent Visits  No visits were found meeting these conditions  Showing recent visits within past 7 days and meeting all other requirements  Today's Visits  Date Type Provider Dept   22 1401 [address]   Showing today's visits and meeting all other requirements  Future Appointments  No visits were found meeting these conditions  Showing future appointments within next 150 days and meeting all other requirements       The patient was identified by name and date of birth  Kirt Stinson was informed that this is a telemedicine visit and that the visit is being conducted through [address] and patient was informed this is a secure, HIPAA-complaint platform  She agrees to proceed     My office door was closed  No one else was in the room  She acknowledged consent and understanding of privacy and security of the video platform  The patient has agreed to participate and understands they can discontinue the visit at any time  Patient is aware this is a billable service  Subjective  Kirt Stinson is a 39 y o  female  17 4/7 weeks gestation  History GDM in 2018 with 1 hour  and 6 months post delivery abnormal glucose tolerance test  Evaluated by 2 endocrinologist without diagnosis  Prefers to test glucose for 1 week vs completing 1 hour glucose tolerance  Last pregnancy was diet controlled  Delivered in 2018 baby girl vaginally who weighed 6 lbs 3 oz at 39 weeks gestation  Discussed with BARBARA on 4/19/22 due to elevation on mychart glucose flowsheet  Patient was advised to continue testing 4 times per day consistently and GDM diet guidelines were reviewed  HPI     Past Medical History:   Diagnosis Date    Anemia     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     carrier    GDM (gestational diabetes mellitus)        Past Surgical History:   Procedure Laterality Date    WISDOM TOOTH EXTRACTION         Current Outpatient Medications   Medication Sig Dispense Refill    Blood Glucose Monitoring Suppl (OneTouch Verio Flex System) w/Device KIT Dispense 1 kit per insurance formulary  1 kit 0    OneTouch Delica Lancets 84B MISC Use 4 a day or as directed   each 6    OneTouch Verio test strip Test 4 times a day and as instructed  Gestational diabetes  100 each 6    Prenatal MV-Min-Fe Fum-FA-DHA (PRENATAL 1 PO) Take by mouth       No current facility-administered medications for this visit  Allergies   Allergen Reactions    Sulfa Antibiotics Rash       Review of Systems    Video Exam    There were no vitals filed for this visit  Physical Exam     I spent 60 minutes with patient today in which greater than 50% of the time was spent in counseling/coordination of care regarding gestational diabetes     VIRTUAL VISIT [address] verbally agrees to participate in [employer]   Pt is aware that [employer] could be limited without vital signs or the ability to perform a full hands-on physical Paul Bo understands she or the provider may request at any time to terminate the video visit and request the patient to seek care or treatment in person  Thank you for referring your patient to [facility] Maternal Fetal Medicine Diabetes in Pregnancy Program      Sangita Hill is a  39 y o  female who presents today for Combo Class 1 and 2  Patient is at 17w4d gestation, Estimated Date of Delivery: 22  Reviewed and updated the following from patients medical record: PMH, Problem List, Allergies, and Current Medications  Visit Diagnosis:  GDM in pregnancy method of control unspecified   Due to consistent elevations on glucose flowsheet     Discussed with patient pathophysiology of GDM, untreated hyperglycemia in pregnancy and maternal fetal complications including fetal macrosomia,  hypoglycemia, polyhydramnios, increased incidence of  section,  labor, and in severe cases fetal demise and still birth   Discussed importance of blood glucose monitoring, nutrition, and medication if necessary in achieving BG goals  Additional Pregnancy Complications:  noted previous history of GDM in 2018 which was diet controlled and also history following per subjective section of note      Labs:  No results found for: [id number]    No results found for: Freeman [place], YXEJTIZ3RH, WKXGFND2EX     No components found for: HGA1C    Medications:  No diabetes related medications    Anthropometrics:  Ht Readings from Last 3 Encounters:   03/15/22 5' 3" (1 6 m)   22 5' 3" (1 6 m)   18 5' 4" (1 626 m)     Wt Readings from Last 3 Encounters:   03/15/22 47 2 kg (104 lb)   22 47 5 kg (104 lb 12 8 oz)   18 53 5 kg (118 lb)     Pre-gravid weight: 104 pounds  Pre-gravid BMI: 18 42 BMI  Weight Change: stable  Weight gain recommendations: 28-40 pounds  Comments: as above IOM guidelines for prepregnancy BMI; underweight    Recent Ultra Sound Results:  Date: 3/11/22  Fetal Growth: Normal  CATHLEEN: Normal  Next US: 22 Detailed ultrasound    Blood Glucose Monitoring:   Glucose Meter: OneTouch Verio Flex  Instructed on testing blood sugars:patient was testing 2 times per day; consistently FBG and alternating meal; advised to continue  4 x per day (Fasting, 2 hour after start of each meal)    Gave instruction on site selection, skin preparation, loading strips and lancet device, meter activation, obtaining blood sample, test strip and lancet disposal and storage, and recording log book entries  Patient has good understanding of material covered and was able to test their own blood sugar in office today  Instruction for reporting blood sugar results weekly via:  Phone: [phone]   OR  My Chart (Message with image attachment, or Glucose Flowsheet)    Goal Blood Sugar Ranges:   Fastin-90 mg/dL  1 hour after the start of each meal: 140 mg/dL or less  2 hours after start of each meal: 120 mg/dL or less            Variability of FBG measurements noted; range  mg/dl; dinner 2 hr pp mainly with highest 157 mg/dl  Advised to change bedtime snack to 1 serving CHO and 2-3 ounces of protein  Examples reviewed  Suggested supplements like Glucerna or Boost glucose control as well  Noted some elevations noted for 2 hr pp measurements  Patient to continue checking 4 times per day as discussed with CRNP  Patient is agreeable  Meal Plan (daily calorie and protein needs):  Calories: 2000 calorie ([facility]:16-24-24-15-48-29) (PRO: /3-1-3/4-1-3/-2) Patient had this meal plan at time of class 1/2 visit and is following  Type of Diet:Regular  Additional Nutrition Concerns: none    Diet Review: patient has been following meal plan very well  At times CHO may be a little less than on the meal plan  Encourage 45 gms of CHO at breakfast, lunch and dinner daily including combinations with protein in all meals and snacks  Meal Plan Tips:  1  Patient was provided with a meal plan including 3 meals and 3 snacks  2  Discussed appropriate amounts of CHO, PRO, and Fat at each meal and snack  3  Reviewed CHO exchange list, and portion sizes for both CHO and PRO via food models  4  Instruction on how to read a food label  5  Provided suggested meal/snack options to increase nutrition and maintain consistent meal and snack intakes  6  Instructed on how to keep a 3-day food diary to be brought to follow- up appointment  7  Encouraged  patient to eat every 2 0-3 5 hours while awake  8  Encouraged patient to go no longer than 8-10 hours fasting overnight until first meal of the day  Physical Activity:  Discussed benefits of physical activity to optimize blood glucose control, encouraged activity at patient is physically able  Always consult a physician prior to starting an exercise program  Recommend 20-30 minutes daily  Is patient physically active? No Patient will try walking daily 20-30 minutes  Sick day Guidelines:   Patient advised that sickness will raise blood sugar and need to continue medication regimen as directed  If blood sugar is > 160 mg/dL twice in one day call doctor  Instructed on what to do when unable to consume normal meal plan  Hypoglycemia & Treatment Guidelines:  Reviewed what hypoglycemia is, signs and symptoms, and how to treat  Post-Partum Guidelines:  Completion of 75 gm CHO 2 hr gtt at 6 weeks post-partum to check for Type 2 DM diagnosis    Breastfeeding Guidelines:  Continue GDM meal plan plus additional 350-500 calories daily  Stay hydrated by drinking 8-10 (8 oz ) fluids daily  Examples of protein and carbohydrate snacks provided  Dining Out & Travel Guidelines:  Patient advised to be prepared with extra diabetes supplies, medications, and snacks, as well as sticking to the same time schedule and portions eaten at home for meals and snacks  Maternal-Fetal Testing:    Ultrasounds- growth scans every 4 weeks     NST- twice weekly starting at 32nd week GA   CATHLEEN- weekly starting at 32 weeks GA    Medication options discussed today     Patient Stated Goal: "I will eat 3 meals and 3 snacks each day, including protein at each"    Diabetes Self Management Support Plan outside of ongoing care: Spouse/Family    Learner/s Present:Learners Present: Patient   Barriers to Learning/Change: No Barriers  Expected Compliance: good    Date to report blood sugars: Requested patient update flow sheet at least weekly  F/U Date: In-basket message sent to CRNP regarding follow up appointment? Begin Time: 11:00 am  End Time: 12:00 PM    It was a pleasure working with them today  Please feel free to call with any questions or concerns      Briseyda Garcia, RD,LDN,CDE  Diabetes Educator  [facility]  Diabetes in Pregnancy Program  [address] [facility], [address] [place] [address],[address]  [address]

## 2022-05-03 ENCOUNTER — DOCUMENTATION (OUTPATIENT)
Dept: PERINATAL CARE | Facility: CLINIC | Age: 37
End: 2022-05-03

## 2022-05-03 NOTE — PROGRESS NOTES
Date: 05/03/22  Latanya Fowler  1985  Estimated Date of Delivery: 9/25/22  19w2d  [facility]N: [facility]  Abnormal Glucose Tolerance    History GDM in 2018 with 1 hour  and 6 months post delivery abnormal glucose tolerance test  Evaluated by 2 endocrinologist without diagnosis  Prefers to test glucose for 1 week vs completing 1 hour glucose tolerance  Last pregnancy was diet controlled  Additional Pregnancy Complications: History of diet controlled GDM          Plan:  Diet: : 3 meals and 3 snacks  Bedtime snack changed to 1 serving CHO and 2-3 ounces of protein  Suggested supplements like Glucerna or Boost glucose control if needed due to appetite  Maintain fasting overnight for at least 8 hrs and no more than 10 hrs prior to FBG measurement  (Had education in 2018)  Testing blood sugars:Case discussed with BARBARA and patient was advised to continue 4 times per day testing  Meter: OneTouch Verio Flex   Education:  Meter education completed with [facility] on 03/15/2022                 Class 1 and 2 completed on 4/21/22   Labs  No components found for: HGA1C   Last A1c 5 0% on 5/7/18    Ultrasounds  03/11/2022 NT  Next US scheduled for 05/04/2022    Mukund Flood, RD,LDN,CDE

## 2022-05-04 ENCOUNTER — ROUTINE PRENATAL (OUTPATIENT)
Dept: PERINATAL CARE | Facility: OTHER | Age: 37
End: 2022-05-04
Payer: COMMERCIAL

## 2022-05-04 VITALS
DIASTOLIC BLOOD PRESSURE: 60 MMHG | HEART RATE: 93 BPM | HEIGHT: 63 IN | SYSTOLIC BLOOD PRESSURE: 101 MMHG | BODY MASS INDEX: 20.27 KG/M2 | WEIGHT: 114.4 LBS

## 2022-05-04 DIAGNOSIS — Z3A.19 19 WEEKS GESTATION OF PREGNANCY: ICD-10-CM

## 2022-05-04 DIAGNOSIS — O24.410 DIET CONTROLLED GESTATIONAL DIABETES MELLITUS (GDM) IN SECOND TRIMESTER: Primary | ICD-10-CM

## 2022-05-04 DIAGNOSIS — Z36.86 ENCOUNTER FOR ANTENATAL SCREENING FOR CERVICAL LENGTH: ICD-10-CM

## 2022-05-04 PROCEDURE — 99214 OFFICE O/P EST MOD 30 MIN: CPT | Performed by: OBSTETRICS & GYNECOLOGY

## 2022-05-04 PROCEDURE — 76817 TRANSVAGINAL US OBSTETRIC: CPT | Performed by: OBSTETRICS & GYNECOLOGY

## 2022-05-04 PROCEDURE — 76811 OB US DETAILED SNGL FETUS: CPT | Performed by: OBSTETRICS & GYNECOLOGY

## 2022-05-04 RX ORDER — ASPIRIN 81 MG/1
162 TABLET ORAL DAILY
COMMUNITY

## 2022-05-04 NOTE — PROGRESS NOTES
The patient was seen today for an ultrasound  Please see ultrasound report (located under Ob Procedures) for additional details  Thank you very much for allowing us to participate in the care of this very nice patient  Should you have any questions, please do not hesitate to contact me  Remi Pandya MD [id number] [facility]  Attending Physician, Luis Eduardo

## 2022-05-05 ENCOUNTER — DOCUMENTATION (OUTPATIENT)
Dept: PERINATAL CARE | Facility: CLINIC | Age: 37
End: 2022-05-05

## 2022-05-05 NOTE — PROGRESS NOTES
Date: 05/05/22  Jaswinder Bailey  1985  Estimated Date of Delivery: 9/25/22  19w4d  OB/GYN: [facility]  Abnormal Glucose Tolerance    History GDM in 2018 with 1 hour  and 6 months post delivery abnormal glucose tolerance test  Evaluated by 2 endocrinologist without diagnosis  Prefers to test glucose for 1 week vs completing 1 hour glucose tolerance  Last pregnancy was diet controlled  Additional Pregnancy Complications: History of diet controlled GDM          Plan:  Diet: : 3 meals and 3 snacks  Bedtime snack changed to 1 serving CHO and 2-3 ounces of protein  Provided with suggestions for easy snacks such as protein granola bars (<15 g sugar on label), supplements like Glucerna shake or snack bars, Boost glucose control, Fairlife protein shake, also referencing page 18 and 19 in the Gestational Diabetes Booklet for more ideas  Suggested patient share the meal she had eaten when 2 hr pp was above 120 mg/dl for suggestions  Maintain fasting overnight for at least 8 hrs and no more than 10 hrs prior to FBG measurement  (Had education in 2018)  Testing blood sugars:Case discussed with BARBARA and patient was advised to continue 4 times per day testing  Meter: OneTouch Verio Flex   Education:  Meter education completed with Michaela Murphy on 03/15/2022                 Class 1 and 2 completed on 4/21/22   Labs  No components found for: HGA1C   Last A1c 5 0% on 5/7/18    Ultrasounds  03/11/2022 NT  05/04/2022 US impressions noted  6/7/22 Fetal echo scheduled  7/1/22 next growth ultrasound    Pop Malave RD,LDN,CDE  Diabetes Education

## 2022-05-06 ENCOUNTER — TELEPHONE (OUTPATIENT)
Dept: PERINATAL CARE | Facility: CLINIC | Age: 37
End: 2022-05-06

## 2022-05-06 NOTE — TELEPHONE ENCOUNTER
Called patient and confirmed date of birth  Asked if her and her  are still interested in pursuing carrier screening for Gaucher disease for Stewart Arnie)  It was not previously performed and would be considered medically necessary by his medical policy (from what we can tell)  Coverage would be 90% after deductible is met  Also reviewed Every Mom Pledge program with maximum out of pocket cost of $299  Reviewed that if he is a carrier there is 25% chance for baby to be affected and that Gaucher disease is also on the  screening in [place]  Ashtyn Whittaker also on phone and had questions about what could be done (aka treatment, etc) during pregnancy if baby is affected  We first reviewed confirming diagnosis via amniocentesis which the patient indicated she was not likely to do  We also discussed that treatments are available postnatally  Pregnancy management would potentially change and a referral to pediatric metabolics at [facility] could be provided  At this time the couple declined screening for Ashtyn Whittaker and was informed that a labslip can be mailed to them to take to [facility] if they ever change their mind  They had no further questions

## 2022-05-17 ENCOUNTER — DOCUMENTATION (OUTPATIENT)
Dept: PERINATAL CARE | Facility: CLINIC | Age: 37
End: 2022-05-17

## 2022-05-17 NOTE — PROGRESS NOTES
Date: 05/17/22  Marykay Babinski  1985  Estimated Date of Delivery: 9/25/22  21w2d  [facility]GYN: [facility]  Abnormal Glucose Tolerance    History GDM in 2018 with 1 hour  and 6 months post delivery abnormal glucose tolerance test  Evaluated by 2 endocrinologist without diagnosis  Prefers to test glucose for 1 week vs completing 1 hour glucose tolerance  Last pregnancy was diet controlled  Additional Pregnancy Complications: History of diet controlled GDM          Plan:  Diet: : 3 meals and 3 snacks  Bedtime snack changed to 1 serving CHO and 2-3 ounces of protein  Provided with suggestions for easy snacks such as protein granola bars (<15 g sugar on label), supplements like Glucerna shake or snack bars, Boost glucose control, Fairlife protein shake, also referencing page 18 and 19 in the Gestational Diabetes Booklet for more ideas  Suggested patient share the meal she had eaten when 2 hr pp was above 120 mg/dl for suggestions  Maintain fasting overnight for at least 8 hrs and no more than 10 hrs prior to FBG measurement  (Had education in 2018)  Testing blood sugars:Case discussed with BARBARA and patient was advised to continue 4 times per day testing  Meter: OneTouch Verio Flex   Education:  Meter education completed with Diana Chappell on 03/15/2022                 Class 1 and 2 completed on 4/21/22   Labs  No components found for: HGA1C   Last A1c 5 0% on 5/7/18    Ultrasounds  03/11/2022 NT  05/04/2022 US impressions noted  6/7/22 Fetal echo scheduled  7/1/22 next growth ultrasound    Jemima Mccall RD,LDN,CDE  Diabetes Education

## 2022-05-26 ENCOUNTER — DOCUMENTATION (OUTPATIENT)
Dept: PERINATAL CARE | Facility: CLINIC | Age: 37
End: 2022-05-26

## 2022-05-26 NOTE — PROGRESS NOTES
Date: 05/26/22  Latanya Fowler  1985  Estimated Date of Delivery: 9/25/22  22w4d  [facility]: [facility]  Abnormal Glucose Tolerance    History GDM in 2018 with 1 hour  and 6 months post delivery abnormal glucose tolerance test  Evaluated by 2 endocrinologist without diagnosis  Prefers to test glucose for 1 week vs completing 1 hour glucose tolerance  Last pregnancy was diet controlled  Additional Pregnancy Complications: History of diet controlled GDM          Plan:  Diet: : 3 meals and 3 snacks  Bedtime snack changed to 1 serving CHO and 2-3 ounces of protein  Suggested patient share the meal she had eaten when 2 hr pp was above 120 mg/dl for suggestions  Maintain fasting overnight for at least 8 hrs and no more than 10 hrs prior to FBG measurement  Testing blood sugars: Continue 4 times per day testing  Meter: OneTouch Verio Flex   Education:  Meter education completed with Sonido Dent on 03/15/2022                Class 1 and 2 completed on 4/21/22     Labs  No components found for: HGA1C   Last A1c 5 0% on 5/7/18    Ultrasounds  03/11/2022 NT  05/04/2022 Level 2 completed   06/07/2022 Fetal echo scheduled  07/01/2022 next growth ultrasound    Jade Corrales RN

## 2022-06-07 ENCOUNTER — ROUTINE PRENATAL (OUTPATIENT)
Dept: PERINATAL CARE | Facility: OTHER | Age: 37
End: 2022-06-07
Payer: COMMERCIAL

## 2022-06-07 VITALS
BODY MASS INDEX: 21.16 KG/M2 | HEIGHT: 63 IN | DIASTOLIC BLOOD PRESSURE: 63 MMHG | SYSTOLIC BLOOD PRESSURE: 99 MMHG | WEIGHT: 119.4 LBS | HEART RATE: 84 BPM

## 2022-06-07 DIAGNOSIS — Z3A.12 12 WEEKS GESTATION OF PREGNANCY: ICD-10-CM

## 2022-06-07 DIAGNOSIS — O24.410 DIET CONTROLLED GESTATIONAL DIABETES MELLITUS (GDM) IN SECOND TRIMESTER: Primary | ICD-10-CM

## 2022-06-07 PROBLEM — Z3A.24 24 WEEKS GESTATION OF PREGNANCY: Status: ACTIVE | Noted: 2022-03-15

## 2022-06-07 PROCEDURE — 93325 DOPPLER ECHO COLOR FLOW MAPG: CPT | Performed by: OBSTETRICS & GYNECOLOGY

## 2022-06-07 PROCEDURE — 76825 ECHO EXAM OF FETAL HEART: CPT | Performed by: OBSTETRICS & GYNECOLOGY

## 2022-06-07 PROCEDURE — 76827 ECHO EXAM OF FETAL HEART: CPT | Performed by: OBSTETRICS & GYNECOLOGY

## 2022-06-07 PROCEDURE — 76816 OB US FOLLOW-UP PER FETUS: CPT | Performed by: OBSTETRICS & GYNECOLOGY

## 2022-06-07 PROCEDURE — 99213 OFFICE O/P EST LOW 20 MIN: CPT | Performed by: OBSTETRICS & GYNECOLOGY

## 2022-06-07 NOTE — PROGRESS NOTES
The patient was seen today for an ultrasound  Please see ultrasound report (located under Ob Procedures) for additional details  Thank you very much for allowing us to participate in the care of this very nice patient  Should you have any questions, please do not hesitate to contact me  Remi Patterson MD [address] [facility]  Attending Physician, Luis Eduardo

## 2022-06-30 NOTE — PROGRESS NOTES
Please refer to the [facility] ultrasound report in Ob Procedures for additional information regarding today's visit

## 2022-07-01 ENCOUNTER — ULTRASOUND (OUTPATIENT)
Dept: PERINATAL CARE | Facility: OTHER | Age: 37
End: 2022-07-01
Payer: COMMERCIAL

## 2022-07-01 VITALS
WEIGHT: 122.8 LBS | BODY MASS INDEX: 21.76 KG/M2 | HEIGHT: 63 IN | DIASTOLIC BLOOD PRESSURE: 63 MMHG | SYSTOLIC BLOOD PRESSURE: 105 MMHG | HEART RATE: 86 BPM

## 2022-07-01 DIAGNOSIS — O09.522 ELDERLY MULTIGRAVIDA, SECOND TRIMESTER: ICD-10-CM

## 2022-07-01 DIAGNOSIS — O24.410 DIET CONTROLLED GESTATIONAL DIABETES MELLITUS (GDM) IN SECOND TRIMESTER: ICD-10-CM

## 2022-07-01 DIAGNOSIS — Z3A.27 27 WEEKS GESTATION OF PREGNANCY: Primary | ICD-10-CM

## 2022-07-01 PROCEDURE — 76816 OB US FOLLOW-UP PER FETUS: CPT | Performed by: OBSTETRICS & GYNECOLOGY

## 2022-07-01 NOTE — PATIENT INSTRUCTIONS
Kick Counts in Pregnancy   WHAT YOU NEED TO KNOW:   Kick counts measure how much your baby is moving in your womb  A kick from your baby can be felt as a twist, turn, swish, roll, or jab  It is common to feel your baby kicking at 26 to 28 weeks of pregnancy  You may feel your baby kick as early as 20 weeks of pregnancy  You may want to start counting at 28 weeks  DISCHARGE INSTRUCTIONS:   Contact your doctor immediately if:   You feel a change in the number of kicks or movements of your baby  You feel fewer than 10 kicks within 2 hours  You have questions or concerns about your baby's movements  Why measure kick counts:  Your baby's movement may provide information about your baby's health  He or she may move less, or not at all, if there are problems  Your baby may move less if he or she is not getting enough oxygen or nutrition from the placenta  Do not smoke while you are pregnant  Smoking decreases the amount of oxygen that gets to your baby  Talk to your healthcare provider if you need help to quit smoking  Tell your healthcare provider as soon as you feel a change in your baby's movements  When to measure kick counts:   Measure kick counts at the same time every day  Measure kick counts when your baby is awake and most active  Your baby may be most active in the evening  How to measure kick counts:  Check that your baby is awake before you measure kick counts  You can wake up your baby by lightly pushing on your belly, walking, or drinking something cold  Your healthcare provider may tell you different ways to measure kick counts  You may be told to do the following:  Use a chart or clock to keep track of the time you start and finish counting  Sit in a chair or lie on your left side  Place your hands on the largest part of your belly  Count until you reach 10 kicks  Write down how much time it takes to count 10 kicks  It may take 30 minutes to 2 hours to count 10 kicks  It should not take more than 2 hours to count 10 kicks  Follow up with your doctor as directed:  Write down your questions so you remember to ask them during your visits  © Copyright [employer] 2022 Information is for End User's use only and may not be sold, redistributed or otherwise used for commercial purposes  All illustrations and images included in CareNotes® are the copyrighted property of [employer] D [employer] M , Inc  or [facility] [employer]   The above information is an  only  It is not intended as medical advice for individual conditions or treatments  Talk to your doctor, nurse or pharmacist before following any medical regimen to see if it is safe and effective for you

## 2022-08-03 NOTE — PROGRESS NOTES
Please refer to the [facility] ultrasound report in Ob Procedures for additional information regarding today's visit

## 2022-08-04 ENCOUNTER — ULTRASOUND (OUTPATIENT)
Dept: PERINATAL CARE | Facility: OTHER | Age: 37
End: 2022-08-04
Payer: COMMERCIAL

## 2022-08-04 VITALS
DIASTOLIC BLOOD PRESSURE: 64 MMHG | HEART RATE: 86 BPM | BODY MASS INDEX: 22.29 KG/M2 | SYSTOLIC BLOOD PRESSURE: 100 MMHG | HEIGHT: 63 IN | WEIGHT: 125.8 LBS

## 2022-08-04 DIAGNOSIS — Z3A.32 32 WEEKS GESTATION OF PREGNANCY: ICD-10-CM

## 2022-08-04 DIAGNOSIS — O24.410 DIET CONTROLLED GESTATIONAL DIABETES MELLITUS (GDM) IN THIRD TRIMESTER: Primary | ICD-10-CM

## 2022-08-04 PROCEDURE — 76816 OB US FOLLOW-UP PER FETUS: CPT | Performed by: OBSTETRICS & GYNECOLOGY

## 2022-08-04 PROCEDURE — 99213 OFFICE O/P EST LOW 20 MIN: CPT | Performed by: OBSTETRICS & GYNECOLOGY

## 2022-08-04 NOTE — PATIENT INSTRUCTIONS
Kick Counts in Pregnancy   WHAT YOU NEED TO KNOW:   Kick counts measure how much your baby is moving in your womb  A kick from your baby can be felt as a twist, turn, swish, roll, or jab  It is common to feel your baby kicking at 26 to 28 weeks of pregnancy  You may feel your baby kick as early as 20 weeks of pregnancy  You may want to start counting at 28 weeks  DISCHARGE INSTRUCTIONS:   Contact your doctor immediately if:   You feel a change in the number of kicks or movements of your baby  You feel fewer than 10 kicks within 2 hours  You have questions or concerns about your baby's movements  Why measure kick counts:  Your baby's movement may provide information about your baby's health  He or she may move less, or not at all, if there are problems  Your baby may move less if he or she is not getting enough oxygen or nutrition from the placenta  Do not smoke while you are pregnant  Smoking decreases the amount of oxygen that gets to your baby  Talk to your healthcare provider if you need help to quit smoking  Tell your healthcare provider as soon as you feel a change in your baby's movements  When to measure kick counts:   Measure kick counts at the same time every day  Measure kick counts when your baby is awake and most active  Your baby may be most active in the evening  How to measure kick counts:  Check that your baby is awake before you measure kick counts  You can wake up your baby by lightly pushing on your belly, walking, or drinking something cold  Your healthcare provider may tell you different ways to measure kick counts  You may be told to do the following:  Use a chart or clock to keep track of the time you start and finish counting  Sit in a chair or lie on your left side  Place your hands on the largest part of your belly  Count until you reach 10 kicks  Write down how much time it takes to count 10 kicks  It may take 30 minutes to 2 hours to count 10 kicks  It should not take more than 2 hours to count 10 kicks  Follow up with your doctor as directed:  Write down your questions so you remember to ask them during your visits  © Copyright [employer] 2022 Information is for End User's use only and may not be sold, redistributed or otherwise used for commercial purposes  All illustrations and images included in CareNotes® are the copyrighted property of [employer]  or [facility] [employer]   The above information is an  only  It is not intended as medical advice for individual conditions or treatments  Talk to your doctor, nurse or pharmacist before following any medical regimen to see if it is safe and effective for you

## 2022-08-22 DIAGNOSIS — O09.291 HISTORY OF GESTATIONAL DIABETES IN PRIOR PREGNANCY, CURRENTLY PREGNANT IN FIRST TRIMESTER: Primary | ICD-10-CM

## 2022-08-22 DIAGNOSIS — Z86.32 HISTORY OF GESTATIONAL DIABETES IN PRIOR PREGNANCY, CURRENTLY PREGNANT IN FIRST TRIMESTER: Primary | ICD-10-CM

## 2022-08-22 RX ORDER — BLOOD SUGAR DIAGNOSTIC
STRIP MISCELLANEOUS
Qty: 100 EACH | Refills: 2 | Status: SHIPPED | OUTPATIENT
Start: 2022-08-22 | End: 2022-08-22

## 2022-08-22 RX ORDER — LANCETS 33 GAUGE
EACH MISCELLANEOUS
Qty: 100 EACH | Refills: 2 | Status: SHIPPED | OUTPATIENT
Start: 2022-08-22 | End: 2022-08-22

## 2025-08-08 ENCOUNTER — NURSE TRIAGE (OUTPATIENT)
Age: 40
End: 2025-08-08

## 2025-08-12 ENCOUNTER — NURSE TRIAGE (OUTPATIENT)
Age: 40
End: 2025-08-12

## 2025-08-13 ENCOUNTER — OFFICE VISIT (OUTPATIENT)
Age: 40
End: 2025-08-13
Payer: COMMERCIAL